# Patient Record
Sex: MALE | Race: WHITE | URBAN - METROPOLITAN AREA
[De-identification: names, ages, dates, MRNs, and addresses within clinical notes are randomized per-mention and may not be internally consistent; named-entity substitution may affect disease eponyms.]

---

## 2018-08-07 ENCOUNTER — MEDICAL CORRESPONDENCE (OUTPATIENT)
Dept: HEALTH INFORMATION MANAGEMENT | Facility: CLINIC | Age: 48
End: 2018-08-07

## 2018-08-13 ENCOUNTER — OFFICE VISIT (OUTPATIENT)
Dept: DERMATOLOGY | Facility: CLINIC | Age: 48
End: 2018-08-13
Payer: COMMERCIAL

## 2018-08-13 DIAGNOSIS — L73.9 FOLLICULITIS: Primary | ICD-10-CM

## 2018-08-13 RX ORDER — MAGNESIUM CARB/ALUMINUM HYDROX 105-160MG
296 TABLET,CHEWABLE ORAL ONCE
COMMUNITY

## 2018-08-13 RX ORDER — TRETINOIN 0.25 MG/G
CREAM TOPICAL
Qty: 20 G | Refills: 1 | Status: SHIPPED | OUTPATIENT
Start: 2018-08-13

## 2018-08-13 RX ORDER — LOSARTAN POTASSIUM 50 MG/1
TABLET ORAL
COMMUNITY
Start: 2018-06-13

## 2018-08-13 ASSESSMENT — PAIN SCALES - GENERAL: PAINLEVEL: NO PAIN (0)

## 2018-08-13 NOTE — LETTER
8/13/2018       RE: Ashok Smith  [address]  [facility] [place] [address]     Dear Colleague,    Thank you for referring your patient, Ashok Smith, to the [facility] DERMATOLOGY at [facility]. Please see a copy of my visit note below.    [facility] Dermatology Note      Dermatology Problem List:    Specialty Problems        Dermatology Diagnoses    LP (lichen planus)        Postinflammatory hyperpigmentation              CC:   Acne (Ashok is here for a consultation about his acne vulgaris. )      Encounter Date: Aug 13, 2018    History of Present Illness:  Mr. Ashok Smith is a 47 year old male who presents as a referral from [place].  Since about 2 months on the beard area painful, infected papules. Now only irritation around the shaving area and one single papule on right mandibular area. Not really acne visible (no comedones).    Past Medical History:   Patient Active Problem List   Diagnosis     Multiple sclerosis (H)     LP (lichen planus)     Postinflammatory hyperpigmentation     History reviewed. No pertinent past medical history.    Allergy History:   No Known Allergies    Social History:  Was under stress about 2 months ago     reports that he has never smoked. He has never used smokeless tobacco.      Family History:  Family History   Problem Relation Age of Onset     Cancer Maternal Grandmother      skin cancer     Cancer Maternal Grandfather      skin cancer     Melanoma No family hx of      Skin Cancer No family hx of        Medications:  Current Outpatient Prescriptions   Medication Sig Dispense Refill     atorvastatin (LIPITOR) 20 MG tablet Take 20 mg by mouth daily.       cholecalciferol (D3 SUPER STRENGTH) 2000 UNITS CAPS Take 1 capsule by mouth daily.       Coenzyme Q10 (CO Q-10) 120 MG CAPS Take 1 tablet by mouth daily.       Cyanocobalamin (B-12 PO) Take 500 mcg by mouth daily.       Dimethyl Fumarate (TECFIDERA) 240 MG CPDR Take 2 capsules by  mouth daily.       Fexofenadine HCl (ALLEGRA PO)        fluocin-hydroquinone-tretinoin 0.01-4-0.05 % CREA Apply to dark skin patches in armpits and hips twice a day for 3 months. (Patient not taking: Reported on 8/13/2018) 30 g 2     fluocinonide (LIDEX) 0.05 % ointment Apply topically 2 times daily To lichen planus rash at the armpits and hips until return to clinic. (Patient not taking: Reported on 8/13/2018) 60 g 5     ketoconazole (NIZORAL) 2 % shampoo To skin rash at entire wet armpits and anterior hips and then wash off after 5 minutes daily. (Patient not taking: Reported on 8/13/2018) 240 mL 11     L-LYSINE PO Take 1 tablet by mouth daily as needed.       lisinopril (PRINIVIL,ZESTRIL) 10 MG tablet Take 10 mg by mouth daily.       losartan (COZAAR) 50 MG tablet        magnesium citrate 1.745 GM/30ML solution Take 296 mLs by mouth once       methylprednisoLONE sodium succinate Inject  into the vein.       Natalizumab (TYSABRI IV) Inject into the vein every 30 days       senna-docusate (SENOKOT-S;PERICOLACE) 8.6-50 MG per tablet Take 1 tablet by mouth every other day.       valACYclovir (VALTREX) 1000 mg tablet Take 1,000 mg by mouth daily.       VITAMIN C, CALCIUM ASCORBATE, PO Take 1 tablet by mouth daily.       ZINC CHELATED PO Take 25 mg by mouth daily.             Review of Systems:  -As per HPI  -Constitutional: The patient denies fatigue, fevers, chills, unintended weight loss, and night sweats.  -HEENT: Patient denies nonhealing oral sores.  -Skin: As above in HPI. No additional skin concerns.    Physical exam:  Vitals: There were no vitals taken for this visit.  GEN: This is a well developed, well-nourished male in no acute distress, in a pleasant mood.    SKIN: Focused examination of the face was performed.  --> on the right mandibula one papule and small papules and erythematous irritation over beard area (shaved area)    -No other lesions of concern on areas examined.     Impression/Plan:    ==>  Folliculitis barbae    Try to change from wet shaving with blade to electric shaving    Disinfect in the morning with BP Lotion (informed about bleaching)    Retin A cream evening (maybe every other evening)      Follow-up if necessary    Again, thank you for allowing me to participate in the care of your patient.      Sincerely,    Jesus Zaman MD

## 2018-08-13 NOTE — PROGRESS NOTES
[facility] Dermatology Note      Dermatology Problem List:    Specialty Problems        Dermatology Diagnoses    LP (lichen planus)        Postinflammatory hyperpigmentation              CC:   Acne (Ashok is here for a consultation about his acne vulgaris. )      Encounter Date: Aug 13, 2018    History of Present Illness:  Mr. Ashok Smith is a 47 year old male who presents as a referral from [place].  Since about 2 months on the beard area painful, infected papules. Now only irritation around the shaving area and one single papule on right mandibular area. Not really acne visible (no comedones).    Past Medical History:   Patient Active Problem List   Diagnosis     Multiple sclerosis (H)     LP (lichen planus)     Postinflammatory hyperpigmentation     History reviewed. No pertinent past medical history.    Allergy History:   No Known Allergies    Social History:  Was under stress about 2 months ago     reports that he has never smoked. He has never used smokeless tobacco.      Family History:  Family History   Problem Relation Age of Onset     Cancer Maternal Grandmother      skin cancer     Cancer Maternal Grandfather      skin cancer     Melanoma No family hx of      Skin Cancer No family hx of        Medications:  Current Outpatient Prescriptions   Medication Sig Dispense Refill     atorvastatin (LIPITOR) 20 MG tablet Take 20 mg by mouth daily.       cholecalciferol (D3 SUPER STRENGTH) 2000 UNITS CAPS Take 1 capsule by mouth daily.       Coenzyme Q10 (CO Q-10) 120 MG CAPS Take 1 tablet by mouth daily.       Cyanocobalamin (B-12 PO) Take 500 mcg by mouth daily.       Dimethyl Fumarate (TECFIDERA) 240 MG CPDR Take 2 capsules by mouth daily.       Fexofenadine HCl (ALLEGRA PO)        fluocin-hydroquinone-tretinoin 0.01-4-0.05 % CREA Apply to dark skin patches in armpits and hips twice a day for 3 months. (Patient not taking: Reported on 8/13/2018) 30 g 2     fluocinonide (LIDEX) 0.05 % ointment  Apply topically 2 times daily To lichen planus rash at the armpits and hips until return to clinic. (Patient not taking: Reported on 8/13/2018) 60 g 5     ketoconazole (NIZORAL) 2 % shampoo To skin rash at entire wet armpits and anterior hips and then wash off after 5 minutes daily. (Patient not taking: Reported on 8/13/2018) 240 mL 11     L-LYSINE PO Take 1 tablet by mouth daily as needed.       lisinopril (PRINIVIL,ZESTRIL) 10 MG tablet Take 10 mg by mouth daily.       losartan (COZAAR) 50 MG tablet        magnesium citrate 1.745 GM/30ML solution Take 296 mLs by mouth once       methylprednisoLONE sodium succinate Inject  into the vein.       Natalizumab (TYSABRI IV) Inject into the vein every 30 days       senna-docusate (SENOKOT-S;PERICOLACE) 8.6-50 MG per tablet Take 1 tablet by mouth every other day.       valACYclovir (VALTREX) 1000 mg tablet Take 1,000 mg by mouth daily.       VITAMIN C, CALCIUM ASCORBATE, PO Take 1 tablet by mouth daily.       ZINC CHELATED PO Take 25 mg by mouth daily.             Review of Systems:  -As per HPI  -Constitutional: The patient denies fatigue, fevers, chills, unintended weight loss, and night sweats.  -HEENT: Patient denies nonhealing oral sores.  -Skin: As above in HPI. No additional skin concerns.    Physical exam:  Vitals: There were no vitals taken for this visit.  GEN: This is a well developed, well-nourished male in no acute distress, in a pleasant mood.    SKIN: Focused examination of the face was performed.  --> on the right mandibula one papule and small papules and erythematous irritation over beard area (shaved area)    -No other lesions of concern on areas examined.     Impression/Plan:    ==> Folliculitis barbae    Try to change from wet shaving with blade to electric shaving    Disinfect in the morning with BP Lotion (informed about bleaching)    Retin A cream evening (maybe every other evening)      Follow-up if necessary

## 2018-08-13 NOTE — MR AVS SNAPSHOT
After Visit Summary   2018    Ashok Smith    MRN: [id number]           Patient Information     Date Of Birth          1970        Visit Information        Provider Department      2018 7:00 AM Jesus Zaman MD [facility] Dermatology        Today's Diagnoses     Folliculitis    -  1       Follow-ups after your visit        Who to contact     Please call your clinic at [phone] to:    Ask questions about your health    Make or cancel appointments    Discuss your medicines    Learn about your test results    Speak to your doctor            Additional Information About Your Visit        MyChart Information     [employer] is an electronic gateway that provides easy, online access to your medical records. With [employer], you can request a clinic appointment, read your test results, renew a prescription or communicate with your care team.     To sign up for [employer] visit the website at [email or web address]   You will be asked to enter the access code listed below, as well as some personal information. Please follow the directions to create your username and password.     Your access code is: [email or web address]  Expires: 2018  6:30 AM     Your access code will  in 90 days. If you need help or a new code, please contact your [facility] or call [phone] for assistance.        Care EveryWhere ID     This is your Care EveryWhere ID. This could be used by other organizations to access your [facility] medical records  [id number]         Blood Pressure from Last 3 Encounters:   13 134/74   13 130/67   13 125/68    Weight from Last 3 Encounters:   No data found for Wt              Today, you had the following     No orders found for display         Today's Medication Changes          These changes are accurate as of 18 10:05 AM.  If you have any questions, ask your nurse or doctor.               Start taking these medicines.         Dose/Directions    benzoyl peroxide 5 % Lotn lotion   Used for:  Folliculitis   Started by:  Jesus Zaman MD        Apply topically every morning   Quantity:  30 mL   Refills:  5       tretinoin 0.025 % cream   Commonly known as:  RETIN-A   Used for:  Folliculitis   Started by:  Jesus Zaman MD        Use every night If too much irritation or dryness use every other night   Quantity:  20 g   Refills:  1            Where to get your medicines      These medications were sent to [place] [address] [place] [address] [place] [id number]     Phone:  [phone]     benzoyl peroxide 5 % Lotn lotion         Some of these will need a paper prescription and others can be bought over the counter.  Ask your nurse if you have questions.     Bring a paper prescription for each of these medications     tretinoin 0.025 % cream                Primary Care Provider Office Phone # Fax #    Ramo Guaman [phone]       [facility] [address] [facility] [address]        Equal Access to Services     KAMAR KLEIN AH: Hadii colleen ku hadasho Soomaali, waaxda luqadaha, qaybta kaalmada adeegyada, waxay idiin haypedro hall. So [facility] [phone].    ATENCIÓN: Si habla español, tiene a daugherty disposición servicios gratuitos de asistencia lingüística. Llame al [phone].    We comply with applicable federal civil rights laws and [place] laws. We do not discriminate on the basis of race, color, national origin, age, disability, sex, sexual orientation, or gender identity.            Thank you!     Thank you for choosing [facility] DERMATOLOGY  for your care. Our goal is always to provide you with excellent care. Hearing back from our patients is one way we can continue to improve our services. Please take a few minutes to complete the written survey that you may receive in the mail after your visit with us. Thank you!             Your Updated  Medication List - Protect others around you: Learn how to safely use, store and throw away your medicines at www.disposemymeds.org.          This list is accurate as of 8/13/18 10:05 AM.  Always use your most recent med list.                   Brand Name Dispense Instructions for use Diagnosis    ALLEGRA PO           B-12 PO      Take 500 mcg by mouth daily.        benzoyl peroxide 5 % Lotn lotion     30 mL    Apply topically every morning    Folliculitis       Co Q-10 120 MG Caps      Take 1 tablet by mouth daily.        D3 SUPER STRENGTH 2000 units Caps   Generic drug:  cholecalciferol      Take 1 capsule by mouth daily.        fluocin-hydroquinone-tretinoin 0.01-4-0.05 % Crea     30 g    Apply to dark skin patches in armpits and hips twice a day for 3 months.    Postinflammatory hyperpigmentation, LP (lichen planus)       fluocinonide 0.05 % ointment    LIDEX    60 g    Apply topically 2 times daily To lichen planus rash at the armpits and hips until return to clinic.    LP (lichen planus), Postinflammatory hyperpigmentation       ketoconazole 2 % shampoo    NIZORAL    240 mL    To skin rash at entire wet armpits and anterior hips and then wash off after 5 minutes daily.    Dermatitis       L-LYSINE PO      Take 1 tablet by mouth daily as needed.        LIPITOR 20 MG tablet   Generic drug:  atorvastatin      Take 20 mg by mouth daily.        lisinopril 10 MG tablet    PRINIVIL/ZESTRIL     Take 10 mg by mouth daily.        losartan 50 MG tablet    COZAAR          magnesium citrate 1.745 GM/30ML solution      Take 296 mLs by mouth once        methylprednisoLONE sodium succinate      Inject  into the vein.        senna-docusate 8.6-50 MG per tablet    SENOKOT-S;PERICOLACE     Take 1 tablet by mouth every other day.        TECFIDERA 240 MG Cpdr   Generic drug:  dimethyl fumarate      Take 2 capsules by mouth daily.        tretinoin 0.025 % cream    RETIN-A    20 g    Use every night If too much irritation or dryness  use every other night    Folliculitis       TYSABRI IV      Inject into the vein every 30 days        VALTREX 1000 mg tablet   Generic drug:  valACYclovir      Take 1,000 mg by mouth daily.        VITAMIN C (CALCIUM ASCORBATE) PO      Take 1 tablet by mouth daily.        ZINC CHELATED PO      Take 25 mg by mouth daily.

## 2018-08-13 NOTE — NURSING NOTE
Dermatology Rooming Note    Ashok Smith's goals for this visit include:   Chief Complaint   Patient presents with     Acne     Ashok is here for a consultation about his acne vulgaris.        Monika Quiles, EMT